# Patient Record
Sex: FEMALE | Race: BLACK OR AFRICAN AMERICAN
[De-identification: names, ages, dates, MRNs, and addresses within clinical notes are randomized per-mention and may not be internally consistent; named-entity substitution may affect disease eponyms.]

---

## 2019-07-11 PROBLEM — Z00.00 ENCOUNTER FOR PREVENTIVE HEALTH EXAMINATION: Status: ACTIVE | Noted: 2019-07-11

## 2019-07-30 ENCOUNTER — APPOINTMENT (OUTPATIENT)
Dept: VASCULAR SURGERY | Facility: CLINIC | Age: 64
End: 2019-07-30
Payer: COMMERCIAL

## 2019-07-30 DIAGNOSIS — E11.9 TYPE 2 DIABETES MELLITUS W/OUT COMPLICATIONS: ICD-10-CM

## 2019-07-30 DIAGNOSIS — I10 ESSENTIAL (PRIMARY) HYPERTENSION: ICD-10-CM

## 2019-07-30 DIAGNOSIS — Z86.39 PERSONAL HISTORY OF OTHER ENDOCRINE, NUTRITIONAL AND METABOLIC DISEASE: ICD-10-CM

## 2019-07-30 PROCEDURE — 93971 EXTREMITY STUDY: CPT

## 2019-07-30 PROCEDURE — 29580 STRAPPING UNNA BOOT: CPT | Mod: RT

## 2019-07-30 PROCEDURE — 99203 OFFICE O/P NEW LOW 30 MIN: CPT | Mod: 25

## 2019-07-31 PROBLEM — Z86.39 HISTORY OF HYPERLIPIDEMIA: Status: ACTIVE | Noted: 2019-07-31

## 2019-07-31 PROBLEM — E11.9 DIABETES: Status: ACTIVE | Noted: 2019-07-31

## 2019-08-06 ENCOUNTER — APPOINTMENT (OUTPATIENT)
Dept: VASCULAR SURGERY | Facility: CLINIC | Age: 64
End: 2019-08-06
Payer: COMMERCIAL

## 2019-08-06 VITALS — SYSTOLIC BLOOD PRESSURE: 160 MMHG | DIASTOLIC BLOOD PRESSURE: 87 MMHG | HEART RATE: 75 BPM | OXYGEN SATURATION: 93 %

## 2019-08-06 VITALS
HEIGHT: 60.75 IN | WEIGHT: 196 LBS | DIASTOLIC BLOOD PRESSURE: 120 MMHG | BODY MASS INDEX: 37.49 KG/M2 | SYSTOLIC BLOOD PRESSURE: 168 MMHG

## 2019-08-06 PROCEDURE — 99213 OFFICE O/P EST LOW 20 MIN: CPT | Mod: 25

## 2019-08-06 PROCEDURE — 29580 STRAPPING UNNA BOOT: CPT | Mod: RT

## 2019-08-08 NOTE — ASSESSMENT
[Arterial/Venous Disease] : arterial/venous disease [FreeTextEntry1] : 63 yo female with right leg edema and ulcers. 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 Recommend UNNA boot to be placed today with pressure over the large perforators. She tolerated well. SHe did not improve with conservative management with compression therapy. Due to GSV reflux and large perforators with nonhealing ulcers for the past few months.  Recommend RFA procedure as soon as possible to help heal the wounds. FU in one week.

## 2019-08-08 NOTE — PHYSICAL EXAM
[Respiratory Effort] : normal respiratory effort [Normal Heart Sounds] : normal heart sounds [2+] : right 2+ [Ankle Swelling (On Exam)] : present [1+] : right 1+ [Ankle Swelling On The Right] : of the right ankle [Alert] : alert [Oriented to Person] : oriented to person [Calm] : calm [de-identified] : well appearing [de-identified] : Multiple superficial ulcers of the right shin. about 3x5cm in total.

## 2019-08-08 NOTE — PHYSICAL EXAM
[Respiratory Effort] : normal respiratory effort [Normal Heart Sounds] : normal heart sounds [2+] : right 2+ [1+] : right 1+ [Ankle Swelling (On Exam)] : present [Ankle Swelling On The Right] : of the right ankle [Calm] : calm [Alert] : alert [Oriented to Person] : oriented to person [de-identified] : well appearing [de-identified] : Multiple superficial ulcers of the right shin. about 4x5cm in total.

## 2019-08-08 NOTE — HISTORY OF PRESENT ILLNESS
[FreeTextEntry1] : 64 year old female with PMH of HTN, HLD and DM presents to the office for vascular evaluation. She was referred here by her podiatrist for non healing wound on the lower right leg. She has had the ulcer on and off for a few months. no significant pain. Wrapping herself with gauze and compression for the past 2 months as instructed by her podiatrist. Only minimal improvement in ulcer.

## 2019-08-08 NOTE — ASSESSMENT
[FreeTextEntry1] : 63 yo female with right leg edema and ulcers. 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 Continue UNNA boot Pending RFA approval by her insurance. FU in one week.  [Arterial/Venous Disease] : arterial/venous disease

## 2019-08-08 NOTE — HISTORY OF PRESENT ILLNESS
[FreeTextEntry1] : 64 year old female with PMH of HTN, HLD and DM presents to the office for vascular evaluation. She was referred here by her podiatrist for non healing wound on the lower right leg. She has had the ulcer on and off for a few months. no significant pain. Wrapping herself with gauze and compression for the past 2 months as instructed by her podiatrist. Only minimal improvement in ulcer. \par \par She wore UNNA boot last week without issue. Took it off last night and very pleased with results.

## 2019-08-13 ENCOUNTER — APPOINTMENT (OUTPATIENT)
Dept: VASCULAR SURGERY | Facility: CLINIC | Age: 64
End: 2019-08-13
Payer: COMMERCIAL

## 2019-08-13 VITALS — HEART RATE: 88 BPM | DIASTOLIC BLOOD PRESSURE: 88 MMHG | SYSTOLIC BLOOD PRESSURE: 160 MMHG | OXYGEN SATURATION: 93 %

## 2019-08-13 PROCEDURE — 99213 OFFICE O/P EST LOW 20 MIN: CPT | Mod: 25

## 2019-08-13 PROCEDURE — 29580 STRAPPING UNNA BOOT: CPT | Mod: RT

## 2019-08-14 NOTE — HISTORY OF PRESENT ILLNESS
[FreeTextEntry1] : 64 year old female with PMH of HTN, HLD and DM presents to the office for non healing wound on the lower right leg. She has had the ulcer on and off for a few months. no significant pain. Tolerating UNNA boots without issue. Pending RFA authorization.

## 2019-08-14 NOTE — ASSESSMENT
[FreeTextEntry1] : 63 yo female with right leg edema and ulcers. 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 Continue UNNA boot Pending RFA approval by her insurance, will tentatively schedule RFA for Aug 27th. FU next week.  [Arterial/Venous Disease] : arterial/venous disease

## 2019-08-14 NOTE — PHYSICAL EXAM
[Respiratory Effort] : normal respiratory effort [Normal Heart Sounds] : normal heart sounds [2+] : right 2+ [Ankle Swelling (On Exam)] : present [1+] : right 1+ [Alert] : alert [Ankle Swelling On The Right] : of the right ankle [Oriented to Person] : oriented to person [Calm] : calm [de-identified] : well appearing [de-identified] : Multiple superficial ulcers of the right shin. about 3x5cm in total.

## 2019-08-20 ENCOUNTER — APPOINTMENT (OUTPATIENT)
Dept: VASCULAR SURGERY | Facility: CLINIC | Age: 64
End: 2019-08-20
Payer: COMMERCIAL

## 2019-08-20 PROCEDURE — 29580 STRAPPING UNNA BOOT: CPT | Mod: RT

## 2019-08-20 PROCEDURE — 99213 OFFICE O/P EST LOW 20 MIN: CPT | Mod: 25

## 2019-08-22 NOTE — ASSESSMENT
[FreeTextEntry1] : 65 yo female with right leg edema and ulcers. 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 Continue UNNA boot until next week. RFA approved and scheduled for next week.  [Arterial/Venous Disease] : arterial/venous disease

## 2019-08-22 NOTE — HISTORY OF PRESENT ILLNESS
[FreeTextEntry1] : 64 year old female with PMH of HTN, HLD and DM presents to the office for non healing wound on the lower right leg. She has had the ulcer on and off for a few months. no significant pain. Tolerating UNNA boots without issue. RFA approved and scheduled for next week.

## 2019-08-22 NOTE — PHYSICAL EXAM
[Respiratory Effort] : normal respiratory effort [2+] : right 2+ [Normal Heart Sounds] : normal heart sounds [1+] : right 1+ [Alert] : alert [Ankle Swelling (On Exam)] : present [Ankle Swelling On The Right] : of the right ankle [Oriented to Person] : oriented to person [Calm] : calm [de-identified] : well appearing [de-identified] : Ulcers almost completely healed, skin is dry, some edema over leg.

## 2019-08-27 ENCOUNTER — APPOINTMENT (OUTPATIENT)
Dept: VASCULAR SURGERY | Facility: CLINIC | Age: 64
End: 2019-08-27
Payer: COMMERCIAL

## 2019-08-27 PROCEDURE — 36475 ENDOVENOUS RF 1ST VEIN: CPT | Mod: RT

## 2019-08-27 NOTE — PROCEDURE
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  \par \par DESCRIPTION OF PROCEDURE:  The patient was taken to the procedure room and placed on the procedure table in the supine position.  Patient identification and site location were confirmed.  Informed consent was obtained and a surgical time out was performed.  After the patient was prepped and draped in the usual sterile fashion, and placed in reverse-Trendelenberg position, local anesthesia was instilled in the skin overlying the access site.  An incision was made overlying the identified entry site with an 11-blade scalpel.  The vein was accessed using ultrasound guidance and with a micropuncture needle, a guide wire was introduced through the needle, which was then exchanged over the wire for a 7 Romanian sheath.  The radiofrqeuency catheter was placed into the vein through the sheath just inferior to the superficial epigastric vein to preserve normal physiologic flow in that vein, which was confirmed via ultrasound.  The catheter tip was placed a minimum of 3 cm distal to the saphenofemoral junction.  Tumescent anesthesia was again confirmed with ultrasound imaging, and under direct external compression along the length of the heating element, radiofrequency energy was applied.  The vein was segmentally ablated by heating a 7 cm segment and then indexing the catheter out by 3.5 cm until the treatment length was completed.  \par \par Repeat ultrasound of the saphenous vein was performed, confirming successful treatment.  Catheter and sheath were withdrawn.  The skin incision over the puncture site was closed with a steri-strip and a compression wrap was applied from the level of the foot to the most proximal level of the treated segment.  The patient was assisted off the procedure table and ambulated with minimal assistance.

## 2019-09-03 ENCOUNTER — APPOINTMENT (OUTPATIENT)
Dept: VASCULAR SURGERY | Facility: CLINIC | Age: 64
End: 2019-09-03
Payer: COMMERCIAL

## 2019-09-03 PROCEDURE — 29580 STRAPPING UNNA BOOT: CPT | Mod: RT

## 2019-09-03 PROCEDURE — 99212 OFFICE O/P EST SF 10 MIN: CPT | Mod: 25

## 2019-09-03 PROCEDURE — 93971 EXTREMITY STUDY: CPT

## 2019-09-03 NOTE — ASSESSMENT
[FreeTextEntry1] : 65 yo female with right leg edema and ulcers. RFA done last week, recommend one more week of UNNA boots and then transition into compression stockings. FU in one month, discussed possibility of sclerotherapy for the perforators if ulcers continue.

## 2019-09-03 NOTE — PHYSICAL EXAM
[Respiratory Effort] : normal respiratory effort [Normal Heart Sounds] : normal heart sounds [2+] : right 2+ [1+] : right 1+ [Ankle Swelling (On Exam)] : present [Ankle Swelling On The Right] : of the right ankle [Alert] : alert [Calm] : calm [Oriented to Person] : oriented to person [de-identified] : well appearing [de-identified] : Ulcers almost completely healed, skin is dry, some edema over leg.

## 2019-09-03 NOTE — HISTORY OF PRESENT ILLNESS
[FreeTextEntry1] : 64 year old female with PMH of HTN, HLD and DM presents to the office for non healing wound on the lower right leg. She has had the ulcer on and off for a few months. no significant pain. Tolerating UNNA boots without issue. RFA done last week, swelling improved.

## 2019-10-01 ENCOUNTER — APPOINTMENT (OUTPATIENT)
Dept: VASCULAR SURGERY | Facility: CLINIC | Age: 64
End: 2019-10-01

## 2019-10-22 ENCOUNTER — APPOINTMENT (OUTPATIENT)
Dept: VASCULAR SURGERY | Facility: CLINIC | Age: 64
End: 2019-10-22
Payer: COMMERCIAL

## 2019-10-22 DIAGNOSIS — L97.909 VARICOSE VEINS OF UNSPECIFIED LOWER EXTREMITY WITH ULCER OF UNSPECIFIED SITE: ICD-10-CM

## 2019-10-22 DIAGNOSIS — I87.2 VENOUS INSUFFICIENCY (CHRONIC) (PERIPHERAL): ICD-10-CM

## 2019-10-22 DIAGNOSIS — I83.009 VARICOSE VEINS OF UNSPECIFIED LOWER EXTREMITY WITH ULCER OF UNSPECIFIED SITE: ICD-10-CM

## 2019-10-22 PROCEDURE — 99213 OFFICE O/P EST LOW 20 MIN: CPT

## 2019-10-25 NOTE — PHYSICAL EXAM
[Respiratory Effort] : normal respiratory effort [Normal Heart Sounds] : normal heart sounds [2+] : right 2+ [Ankle Swelling On The Right] : of the right ankle [1+] : right 1+ [Ankle Swelling (On Exam)] : present [Alert] : alert [Oriented to Person] : oriented to person [Calm] : calm [de-identified] : well appearing [de-identified] : right leg with chronic venous stasis dermatitis, dry skin.

## 2019-10-25 NOTE — HISTORY OF PRESENT ILLNESS
[FreeTextEntry1] : 64 year old female with PMH of HTN, HLD and DM presents to the office for FU. She had non healing wounds on the right leg and wore unna boots for a few weeks, no UNNA boot for the last month.  RFA was done 8/27/19. She is doing well, no new ulcers or skin breakdown. Has been using Cerave that she was given by her dermatologist.

## 2019-10-25 NOTE — ASSESSMENT
[FreeTextEntry1] : 65 yo female with right leg edema and ulcers, now healed. RFA done 8/27/19. She does have chronic changes from venous insufficiency, advised that this will not completely go away. Continue to wear compression stockings and moisturize the legs, FU PRN. \par \par if develops recurrent ulcer may need perforators treated. [Arterial/Venous Disease] : arterial/venous disease

## 2021-10-06 PROBLEM — I10 ESSENTIAL HYPERTENSION: Status: ACTIVE | Noted: 2019-07-31
